# Patient Record
Sex: MALE | Race: WHITE | NOT HISPANIC OR LATINO | Employment: FULL TIME | URBAN - METROPOLITAN AREA
[De-identification: names, ages, dates, MRNs, and addresses within clinical notes are randomized per-mention and may not be internally consistent; named-entity substitution may affect disease eponyms.]

---

## 2024-03-12 ENCOUNTER — APPOINTMENT (EMERGENCY)
Dept: CT IMAGING | Facility: HOSPITAL | Age: 49
End: 2024-03-12
Payer: COMMERCIAL

## 2024-03-12 ENCOUNTER — HOSPITAL ENCOUNTER (EMERGENCY)
Facility: HOSPITAL | Age: 49
Discharge: HOME | End: 2024-03-12
Attending: EMERGENCY MEDICINE | Admitting: EMERGENCY MEDICINE
Payer: COMMERCIAL

## 2024-03-12 VITALS
SYSTOLIC BLOOD PRESSURE: 146 MMHG | HEART RATE: 50 BPM | TEMPERATURE: 97.6 F | RESPIRATION RATE: 18 BRPM | DIASTOLIC BLOOD PRESSURE: 89 MMHG | OXYGEN SATURATION: 100 %

## 2024-03-12 DIAGNOSIS — R93.5 ABNORMAL CT OF THE ABDOMEN: ICD-10-CM

## 2024-03-12 DIAGNOSIS — R31.9 HEMATURIA: Primary | ICD-10-CM

## 2024-03-12 DIAGNOSIS — N20.1 CALCULUS OF URETEROVESICAL JUNCTION (UVJ): ICD-10-CM

## 2024-03-12 DIAGNOSIS — F10.90 ALCOHOL USE DISORDER: ICD-10-CM

## 2024-03-12 LAB
ALBUMIN SERPL BCP-MCNC: 4.7 G/DL (ref 3.5–5)
ALP SERPL-CCNC: 75 U/L (ref 34–104)
ALT SERPL W P-5'-P-CCNC: 80 U/L (ref 7–52)
AMORPH URATE CRY URNS QL MICRO: ABNORMAL
ANION GAP SERPL CALCULATED.3IONS-SCNC: 10 MMOL/L (ref 4–13)
AST SERPL W P-5'-P-CCNC: 50 U/L (ref 13–39)
BACTERIA UR QL AUTO: ABNORMAL /HPF
BASOPHILS # BLD AUTO: 0.08 THOUSANDS/ÂΜL (ref 0–0.1)
BASOPHILS NFR BLD AUTO: 1 % (ref 0–1)
BILIRUB SERPL-MCNC: 0.84 MG/DL (ref 0.2–1)
BILIRUB UR QL STRIP: NEGATIVE
BUN SERPL-MCNC: 14 MG/DL (ref 5–25)
CALCIUM SERPL-MCNC: 10 MG/DL (ref 8.4–10.2)
CHLORIDE SERPL-SCNC: 100 MMOL/L (ref 96–108)
CLARITY UR: ABNORMAL
CO2 SERPL-SCNC: 25 MMOL/L (ref 21–32)
COLOR UR: ABNORMAL
CREAT SERPL-MCNC: 1.22 MG/DL (ref 0.6–1.3)
EOSINOPHIL # BLD AUTO: 0.08 THOUSAND/ÂΜL (ref 0–0.61)
EOSINOPHIL NFR BLD AUTO: 1 % (ref 0–6)
ERYTHROCYTE [DISTWIDTH] IN BLOOD BY AUTOMATED COUNT: 11.9 % (ref 11.6–15.1)
GFR SERPL CREATININE-BSD FRML MDRD: 68 ML/MIN/1.73SQ M
GLUCOSE SERPL-MCNC: 111 MG/DL (ref 65–140)
GLUCOSE UR STRIP-MCNC: NEGATIVE MG/DL
HCT VFR BLD AUTO: 44.1 % (ref 36.5–49.3)
HGB BLD-MCNC: 15.4 G/DL (ref 12–17)
HGB UR QL STRIP.AUTO: ABNORMAL
IMM GRANULOCYTES # BLD AUTO: 0.04 THOUSAND/UL (ref 0–0.2)
IMM GRANULOCYTES NFR BLD AUTO: 0 % (ref 0–2)
KETONES UR STRIP-MCNC: NEGATIVE MG/DL
LEUKOCYTE ESTERASE UR QL STRIP: NEGATIVE
LIPASE SERPL-CCNC: 26 U/L (ref 11–82)
LYMPHOCYTES # BLD AUTO: 2.2 THOUSANDS/ÂΜL (ref 0.6–4.47)
LYMPHOCYTES NFR BLD AUTO: 21 % (ref 14–44)
MCH RBC QN AUTO: 33.2 PG (ref 26.8–34.3)
MCHC RBC AUTO-ENTMCNC: 34.9 G/DL (ref 31.4–37.4)
MCV RBC AUTO: 95 FL (ref 82–98)
MONOCYTES # BLD AUTO: 0.94 THOUSAND/ÂΜL (ref 0.17–1.22)
MONOCYTES NFR BLD AUTO: 9 % (ref 4–12)
MUCOUS THREADS UR QL AUTO: ABNORMAL
NEUTROPHILS # BLD AUTO: 6.92 THOUSANDS/ÂΜL (ref 1.85–7.62)
NEUTS SEG NFR BLD AUTO: 68 % (ref 43–75)
NITRITE UR QL STRIP: NEGATIVE
NON-SQ EPI CELLS URNS QL MICRO: ABNORMAL /HPF
NRBC BLD AUTO-RTO: 0 /100 WBCS
PH UR STRIP.AUTO: 6.5 [PH]
PLATELET # BLD AUTO: 248 THOUSANDS/UL (ref 149–390)
PMV BLD AUTO: 8.8 FL (ref 8.9–12.7)
POTASSIUM SERPL-SCNC: 3.9 MMOL/L (ref 3.5–5.3)
PROT SERPL-MCNC: 7.8 G/DL (ref 6.4–8.4)
PROT UR STRIP-MCNC: ABNORMAL MG/DL
RBC # BLD AUTO: 4.64 MILLION/UL (ref 3.88–5.62)
RBC #/AREA URNS AUTO: ABNORMAL /HPF
SODIUM SERPL-SCNC: 135 MMOL/L (ref 135–147)
SP GR UR STRIP.AUTO: 1.02 (ref 1–1.03)
UROBILINOGEN UR STRIP-ACNC: <2 MG/DL
WBC # BLD AUTO: 10.26 THOUSAND/UL (ref 4.31–10.16)
WBC #/AREA URNS AUTO: ABNORMAL /HPF

## 2024-03-12 PROCEDURE — 99284 EMERGENCY DEPT VISIT MOD MDM: CPT | Performed by: EMERGENCY MEDICINE

## 2024-03-12 PROCEDURE — 80053 COMPREHEN METABOLIC PANEL: CPT | Performed by: EMERGENCY MEDICINE

## 2024-03-12 PROCEDURE — 96361 HYDRATE IV INFUSION ADD-ON: CPT

## 2024-03-12 PROCEDURE — 96375 TX/PRO/DX INJ NEW DRUG ADDON: CPT

## 2024-03-12 PROCEDURE — 96374 THER/PROPH/DIAG INJ IV PUSH: CPT

## 2024-03-12 PROCEDURE — 81001 URINALYSIS AUTO W/SCOPE: CPT | Performed by: EMERGENCY MEDICINE

## 2024-03-12 PROCEDURE — 36415 COLL VENOUS BLD VENIPUNCTURE: CPT

## 2024-03-12 PROCEDURE — 74176 CT ABD & PELVIS W/O CONTRAST: CPT

## 2024-03-12 PROCEDURE — 99284 EMERGENCY DEPT VISIT MOD MDM: CPT

## 2024-03-12 PROCEDURE — 83690 ASSAY OF LIPASE: CPT | Performed by: EMERGENCY MEDICINE

## 2024-03-12 PROCEDURE — 85025 COMPLETE CBC W/AUTO DIFF WBC: CPT | Performed by: EMERGENCY MEDICINE

## 2024-03-12 RX ORDER — OXYCODONE HYDROCHLORIDE 5 MG/1
5 TABLET ORAL ONCE
Status: COMPLETED | OUTPATIENT
Start: 2024-03-12 | End: 2024-03-12

## 2024-03-12 RX ORDER — OXYCODONE HYDROCHLORIDE 5 MG/1
5 TABLET ORAL EVERY 6 HOURS PRN
Qty: 20 TABLET | Refills: 0 | Status: SHIPPED | OUTPATIENT
Start: 2024-03-12

## 2024-03-12 RX ORDER — ONDANSETRON 4 MG/1
4 TABLET, ORALLY DISINTEGRATING ORAL EVERY 6 HOURS PRN
Qty: 20 TABLET | Refills: 0 | Status: SHIPPED | OUTPATIENT
Start: 2024-03-12

## 2024-03-12 RX ORDER — KETOROLAC TROMETHAMINE 30 MG/ML
15 INJECTION, SOLUTION INTRAMUSCULAR; INTRAVENOUS ONCE
Status: COMPLETED | OUTPATIENT
Start: 2024-03-12 | End: 2024-03-12

## 2024-03-12 RX ORDER — TAMSULOSIN HYDROCHLORIDE 0.4 MG/1
0.4 CAPSULE ORAL
Qty: 14 CAPSULE | Refills: 0 | Status: SHIPPED | OUTPATIENT
Start: 2024-03-12 | End: 2024-03-26

## 2024-03-12 RX ORDER — ONDANSETRON 2 MG/ML
4 INJECTION INTRAMUSCULAR; INTRAVENOUS ONCE
Status: COMPLETED | OUTPATIENT
Start: 2024-03-12 | End: 2024-03-12

## 2024-03-12 RX ORDER — IBUPROFEN 400 MG/1
400 TABLET ORAL EVERY 6 HOURS PRN
Qty: 56 TABLET | Refills: 0 | Status: SHIPPED | OUTPATIENT
Start: 2024-03-12 | End: 2024-03-26

## 2024-03-12 RX ADMIN — ONDANSETRON 4 MG: 2 INJECTION INTRAMUSCULAR; INTRAVENOUS at 14:41

## 2024-03-12 RX ADMIN — KETOROLAC TROMETHAMINE 15 MG: 30 INJECTION, SOLUTION INTRAMUSCULAR; INTRAVENOUS at 14:41

## 2024-03-12 RX ADMIN — SODIUM CHLORIDE 1000 ML: 0.9 INJECTION, SOLUTION INTRAVENOUS at 14:43

## 2024-03-12 RX ADMIN — OXYCODONE HYDROCHLORIDE 5 MG: 5 TABLET ORAL at 14:41

## 2024-03-12 NOTE — Clinical Note
accompanied James Orosco to the emergency department on 3/12/2024.    Return date if applicable: 03/13/2024        If you have any questions or concerns, please don't hesitate to call.      Tamie Bragg MD

## 2024-03-12 NOTE — ED PROVIDER NOTES
History  Chief Complaint   Patient presents with    Blood in Urine     Pt presents with blood in urine as well as left sided abdominal pain. Started yesterday and pain got worse today     48-year-old male with no significant past medical history presents with hematuria and abdominal pain.  States 1 day history of progressively worsening left lower quadrant abdominal pain that initially started out as dull and now sharp and radiating to the groins.  Had noted red discoloration of his urine yesterday that has now progressed to bloody urine per patient that is pink in nature.  No previous history of abdominal pain.  No previous history of kidney stones.  No previous history of abdominal surgeries or urological procedures.  Denies any changes in sexual activity or aggressive sexual behavior.  Denies history of hernia or bulging in the groin.  Has had diarrhea, nonbloody.  Denies recent infections, viral illnesses, sore throat.  Nausea and vomiting while in the emergency department.  Drinks 8 beers a day for the past couple years.  Denies recreational drug use or tobacco use.  Works in [employer].  Has been able to continue urinating without issues.  Associated dysuria.  Denies discharge from the penis.  Denies trauma or recent injuries.      Blood in Urine  Irritative symptoms do not include urgency. Associated symptoms include abdominal pain, dysuria, flank pain, nausea and vomiting. Pertinent negatives include no fever.       None       History reviewed. No pertinent past medical history.    History reviewed. No pertinent surgical history.    History reviewed. No pertinent family history.  I have reviewed and agree with the history as documented.    E-Cigarette/Vaping     E-Cigarette/Vaping Substances     Social History     Tobacco Use    Smoking status: Never    Smokeless tobacco: Never   Substance Use Topics    Alcohol use: Yes    Drug use: Never        Review of Systems   Constitutional:  Negative for activity change,  diaphoresis, fatigue and fever.   Respiratory:  Negative for chest tightness and shortness of breath.    Cardiovascular:  Negative for chest pain and palpitations.   Gastrointestinal:  Positive for abdominal pain, diarrhea, nausea and vomiting. Negative for blood in stool and rectal pain.   Genitourinary:  Positive for dysuria, flank pain and hematuria. Negative for decreased urine volume, difficulty urinating, penile discharge, penile pain, penile swelling, scrotal swelling, testicular pain and urgency.   Musculoskeletal:  Negative for back pain.   Skin:  Negative for color change.   Neurological:  Negative for weakness.   Psychiatric/Behavioral:  Negative for confusion.        Physical Exam  ED Triage Vitals   Temperature Pulse Respirations Blood Pressure SpO2   03/12/24 1435 03/12/24 1153 03/12/24 1153 03/12/24 1153 03/12/24 1153   97.6 °F (36.4 °C) 57 16 (!) 167/111 100 %      Temp Source Heart Rate Source Patient Position - Orthostatic VS BP Location FiO2 (%)   03/12/24 1435 03/12/24 1153 03/12/24 1153 03/12/24 1153 --   Oral Monitor Sitting Right arm       Pain Score       03/12/24 1151       10 - Worst Possible Pain             Orthostatic Vital Signs  Vitals:    03/12/24 1153 03/12/24 1435   BP: (!) 167/111 146/89   Pulse: 57 (!) 50   Patient Position - Orthostatic VS: Sitting Sitting       Physical Exam  Vitals and nursing note reviewed.   Constitutional:       General: He is in acute distress.      Appearance: Normal appearance. He is normal weight. He is not ill-appearing, toxic-appearing or diaphoretic.   HENT:      Head: Normocephalic and atraumatic.      Right Ear: External ear normal.      Left Ear: External ear normal.      Nose: Nose normal.      Mouth/Throat:      Mouth: Mucous membranes are moist.   Eyes:      Extraocular Movements: Extraocular movements intact.   Cardiovascular:      Rate and Rhythm: Normal rate and regular rhythm.      Pulses: Normal pulses.      Heart sounds: Normal heart  sounds.   Pulmonary:      Effort: Pulmonary effort is normal.      Breath sounds: Normal breath sounds.   Abdominal:      General: Bowel sounds are normal. There is no distension.      Palpations: Abdomen is soft. There is no hepatomegaly, splenomegaly or mass.      Tenderness: There is abdominal tenderness in the suprapubic area, left upper quadrant and left lower quadrant. There is guarding (Left lower quadrant.). There is no right CVA tenderness or left CVA tenderness. Negative signs include Bowen's sign and McBurney's sign.      Hernia: No hernia is present. There is no hernia in the ventral area, left inguinal area, right femoral area, left femoral area or right inguinal area.   Musculoskeletal:         General: No swelling, tenderness, deformity or signs of injury. Normal range of motion.      Cervical back: Neck supple.      Right lower leg: No edema.      Left lower leg: No edema.   Skin:     General: Skin is warm and dry.      Capillary Refill: Capillary refill takes less than 2 seconds.      Coloration: Skin is not jaundiced or pale.      Findings: No bruising, erythema, lesion or rash.   Neurological:      Mental Status: He is alert and oriented to person, place, and time.      Gait: Gait normal.   Psychiatric:         Mood and Affect: Mood normal.         Behavior: Behavior normal.         ED Medications  Medications   ketorolac (TORADOL) injection 15 mg (15 mg Intravenous Given 3/12/24 1441)   sodium chloride 0.9 % bolus 1,000 mL (0 mL Intravenous Stopped 3/12/24 1628)   oxyCODONE (ROXICODONE) IR tablet 5 mg (5 mg Oral Given 3/12/24 1441)   ondansetron (ZOFRAN) injection 4 mg (4 mg Intravenous Given 3/12/24 1441)       Diagnostic Studies  Results Reviewed       Procedure Component Value Units Date/Time    Urine Microscopic [[id number]]  (Abnormal) Collected: 03/12/24 1257    Lab Status: Final result Specimen: Urine, Clean Catch Updated: 03/12/24 1502     RBC, UA Innumerable /hpf      WBC, UA 10-20 /hpf       Epithelial Cells None Seen /hpf      Bacteria, UA Occasional /hpf      MUCUS THREADS Occasional     Amorphous Crystals, UA Occasional    UA (URINE) with reflex to Scope [[id number]]  (Abnormal) Collected: 03/12/24 1257    Lab Status: Final result Specimen: Urine, Clean Catch Updated: 03/12/24 1337     Color, UA Light Orange     Clarity, UA Turbid     Specific Gravity, UA 1.021     pH, UA 6.5     Leukocytes, UA Negative     Nitrite, UA Negative     Protein, UA 50 (1+) mg/dl      Glucose, UA Negative mg/dl      Ketones, UA Negative mg/dl      Urobilinogen, UA <2.0 mg/dl      Bilirubin, UA Negative     Occult Blood, UA Large    Comprehensive metabolic panel [[id number]]  (Abnormal) Collected: 03/12/24 1154    Lab Status: Final result Specimen: Blood from Arm, Right Updated: 03/12/24 1236     Sodium 135 mmol/L      Potassium 3.9 mmol/L      Chloride 100 mmol/L      CO2 25 mmol/L      ANION GAP 10 mmol/L      BUN 14 mg/dL      Creatinine 1.22 mg/dL      Glucose 111 mg/dL      Calcium 10.0 mg/dL      AST 50 U/L      ALT 80 U/L      Alkaline Phosphatase 75 U/L      Total Protein 7.8 g/dL      Albumin 4.7 g/dL      Total Bilirubin 0.84 mg/dL      eGFR 68 ml/min/1.73sq m     Narrative:      National Kid[employer] Disease Foundation guidelines for Chronic Kidney Disease (CKD):     Stage 1 with normal or high GFR (GFR > 90 mL/min/1.73 square meters)    Stage 2 Mild CKD (GFR = 60-89 mL/min/1.73 square meters)    Stage 3A Moderate CKD (GFR = 45-59 mL/min/1.73 square meters)    Stage 3B Moderate CKD (GFR = 30-44 mL/min/1.73 square meters)    Stage 4 Severe CKD (GFR = 15-29 mL/min/1.73 square meters)    Stage 5 End Stage CKD (GFR <15 mL/min/1.73 square meters)  Note: GFR calculation is accurate only with a steady state creatinine    Lipase [[id number]]  (Normal) Collected: 03/12/24 1154    Lab Status: Final result Specimen: Blood from Arm, Right Updated: 03/12/24 1236     Lipase 26 u/L     CBC and differential [[id number]]  (Abnormal)  Collected: 03/12/24 1154    Lab Status: Final result Specimen: Blood from Arm, Right Updated: 03/12/24 1214     WBC 10.26 Thousand/uL      RBC 4.64 Million/uL      Hemoglobin 15.4 g/dL      Hematocrit 44.1 %      MCV 95 fL      MCH 33.2 pg      MCHC 34.9 g/dL      RDW 11.9 %      MPV 8.8 fL      Platelets 248 Thousands/uL      nRBC 0 /100 WBCs      Neutrophils Relative 68 %      Immature Grans % 0 %      Lymphocytes Relative 21 %      Monocytes Relative 9 %      Eosinophils Relative 1 %      Basophils Relative 1 %      Neutrophils Absolute 6.92 Thousands/µL      Absolute Immature Grans 0.04 Thousand/uL      Absolute Lymphocytes 2.20 Thousands/µL      Absolute Monocytes 0.94 Thousand/µL      Eosinophils Absolute 0.08 Thousand/µL      Basophils Absolute 0.08 Thousands/µL                    CT renal stone study abdomen pelvis without contrast   Final Result by Davin Phillips MD (03/12 1550)   1. 5 mm left UVJ calculus resulting in mild left hydroureteronephrosis and perinephric/periureteral stranding. Additional punctate left lower renal pole calculus.   2. Hyperattenuating indeterminate right interpolar nodular renal lesion. Correlate with ultrasound on a nonemergent basis.      The study was marked in EPIC for significant notification.               Workstation performed: [id number]               Procedures  Procedures      ED Course  ED Course as of 03/12/24 1648   Tue Mar 12, 2024   1419 Blood, UA(!): Large   1514 No systemic signs or symptoms of infection, afebrile.  Creatinine 1.22 and BUN 14 with GFR of 68, no previous values, no ADA.                                       Medical Decision Making  48-year-old male presents with left flank pain and hematuria.  Differential includes but not limited to kidney stones, RCC, cystitis, pyelonephritis    Patient given oxycodone for pain.  Imaging notable for calculus of UVJ and abnormal CT of the abdomen concerning hyperattenuation of kidney.  Discussed findings with  patient including hyperattenuation of the kidney.  No systemic signs or symptoms of infection and afebrile.  Normal kidney function without ADA.  Pain well-controlled with Toradol and oxycodone.    Discussed findings with patient.  Discharged home to self-care with urology follow-up.  Discussed straining urine to collect kidney stone for urology follow-up.  Educated on increased water consumption to prevent further kidney stones.  Educated on alcohol reduction/cessation.  Follow-up information regarding internal medicine clinic given to establish care and follow-up with abnormal CT scan with hyperattenuation of kidney.  Patient discharged with oxycodone, ibuprofen, Flomax, Zofran.  Patient discharged home to self-care with strict return precautions.  Patient understanding and agreement with plan.    Amount and/or Complexity of Data Reviewed  Labs: ordered. Decision-making details documented in ED Course.  Radiology: ordered.    Risk  Prescription drug management.          Disposition  Final diagnoses:   Hematuria   Alcohol use disorder   Calculus of ureterovesical junction (UVJ)   Abnormal CT of the abdomen - Hyperattenuating indeterminate right interpolar nodular renal lesion. Correlate with ultrasound on a nonemergent basis     Time reflects when diagnosis was documented in both MDM as applicable and the Disposition within this note       Time User Action Codes Description Comment    3/12/2024  3:23 PM Bard, Aerin J Add [R31.9] Hematuria     3/12/2024  3:23 PM Bard, Aerin J Add [Z78.9] Alcohol use     3/12/2024  3:24 PM Bard, Aerin J Add [F10.90] Alcohol use disorder     3/12/2024  3:24 PM Bard, Aerin J Remove [Z78.9] Alcohol use     3/12/2024  3:58 PM Bard, Aerin J Add [N20.1] Calculus of ureterovesical junction (UVJ)     3/12/2024  4:02 PM Bard, Aerin J Add [R93.5] Abnormal CT of the abdomen     3/12/2024  4:02 PM Bard, Aerin J Modify [R93.5] Abnormal CT of the abdomen Hyperattenuating indeterminate right  interpolar nodular renal lesion. Correlate with ultrasound on a nonemergent basis          ED Disposition       ED Disposition   Discharge    Condition   Stable    Date/Time   Tue Mar 12, 2024  3:59 PM    Comment   James Orosco discharge to home/self care.                   Follow-up Information       Follow up With Specialties Details Why Contact Info Additional Information    [facility] Schedule an appointment as soon as possible for a visit in 1 week Establish primary care physician and for further evaluation of findings in patient instructions [address] [facility] [id number] [phone] [facility] [address] [place], [id number] [phone]    [facility] Emergency Department Emergency Medicine Go to  If symptoms worsen [address] [facility] [address]  [phone] [facility], [address], [place], [address]    [facility] Urology Pod Urology Schedule an appointment as soon as possible for a visit   [address] [facility] [address]             Discharge Medication List as of 3/12/2024  4:13 PM        START taking these medications    Details   ibuprofen (MOTRIN) 400 mg tablet Take 1 tablet (400 mg total) by mouth every 6 (six) hours as needed for mild pain for up to 14 days, Starting Tue 3/12/2024, Until Tue 3/26/2024 at 2359, Normal      ondansetron (ZOFRAN-ODT) 4 mg disintegrating tablet Take 1 tablet (4 mg total) by mouth every 6 (six) hours as needed for nausea or vomiting, Starting Tue 3/12/2024, Normal      oxyCODONE (Roxicodone) 5 immediate release tablet Take 1 tablet (5 mg total) by mouth every 6 (six) hours as needed for moderate pain or severe pain for up to 20 doses As needed for left kidney stone pain Max Daily Amount: 20 mg, Starting Tue 3/12/2024, Normal      tamsulosin (FLOMAX)  0.4 mg Take 1 capsule (0.4 mg total) by mouth daily with dinner for 14 days, Starting Tue 3/12/2024, Until Tue 3/26/2024, Normal               PDMP Review       None             ED Provider  Attending physically available and evaluated James Orosco. I managed the patient along with the ED Attending.    Electronically Signed by           Tamie Bragg MD  03/12/24 2455

## 2024-03-12 NOTE — DISCHARGE INSTRUCTIONS
Hyperattenuating indeterminate right interpolar nodular renal lesion. Correlate with ultrasound on a nonemergent basis

## 2024-03-12 NOTE — Clinical Note
James Orosco was seen and treated in our emergency department on 3/12/2024.                Diagnosis:     James  .    He may return on this date: 03/14/2024         If you have any questions or concerns, please don't hesitate to call.      Tamie Bragg MD    ______________________________           _______________          _______________  Hospital Representative                              Date                                Time

## 2024-03-13 NOTE — ED ATTENDING ATTESTATION
3/12/2024  IValerio MD, saw and evaluated the patient. I have discussed the patient with the resident/non-physician practitioner and agree with the resident's/non-physician practitioner's findings, Plan of Care, and MDM as documented in the resident's/non-physician practitioner's note, except where noted. All available labs and Radiology studies were reviewed.  I was present for key portions of any procedure(s) performed by the resident/non-physician practitioner and I was immediately available to provide assistance.       At this point I agree with the current assessment done in the Emergency Department.  I have conducted an independent evaluation of this patient a history and physical is as follows:see above for h and p- agree with er resident tx plan / dispo    ED Course  ED Course as of 03/12/24 2147   Tue Mar 12, 2024   1424 Er md note- initial er workup was first nursed    1447 ER MD NOTE- PT SEEN AND THOROUGHLY EVALUATED BY ER MD - CASE D/W ER RESIdent- all studies reviewed by er md - 48 yr male with onset yesterday of  llq gas like  abd pain -- today with pink hematuria- decreasing urination with worsening  left flank pain rad to groin with n/vacation x 4 - no previous hx of kidney stones- no recent weight loss / hx fo gradually decreasing urine stream -- avss- htnsive which improved in the er - pulse ox 100 % on ra- interpretation is normal- no intervention - mm pink- rrr s1/s2/cta-b-soft abd/ pos left upper/mid/ llq tenderness to palpation - no peritoneal signs    1522 Er md note- pt -re-eval feels improved with pain- aware  of waiting for ct scan results    1631 Er md note- upon  er d/c- pt resting in nad- all d/c instructions and reasons when to return to  the er - discussed with pt - and   who verbalizes understanding          Critical Care Time  Procedures